# Patient Record
Sex: MALE | Race: WHITE | NOT HISPANIC OR LATINO | Employment: OTHER | URBAN - METROPOLITAN AREA
[De-identification: names, ages, dates, MRNs, and addresses within clinical notes are randomized per-mention and may not be internally consistent; named-entity substitution may affect disease eponyms.]

---

## 2021-02-08 ENCOUNTER — OFFICE VISIT (OUTPATIENT)
Dept: FAMILY MEDICINE CLINIC | Facility: CLINIC | Age: 31
End: 2021-02-08

## 2021-02-08 VITALS
TEMPERATURE: 98.6 F | SYSTOLIC BLOOD PRESSURE: 119 MMHG | DIASTOLIC BLOOD PRESSURE: 68 MMHG | BODY MASS INDEX: 19.46 KG/M2 | WEIGHT: 128.4 LBS | HEIGHT: 68 IN | RESPIRATION RATE: 16 BRPM | OXYGEN SATURATION: 99 % | HEART RATE: 82 BPM

## 2021-02-08 DIAGNOSIS — K59.00 CONSTIPATION, UNSPECIFIED CONSTIPATION TYPE: ICD-10-CM

## 2021-02-08 DIAGNOSIS — R30.0 DYSURIA: ICD-10-CM

## 2021-02-08 DIAGNOSIS — M54.16 LUMBAR BACK PAIN WITH RADICULOPATHY AFFECTING LEFT LOWER EXTREMITY: Primary | ICD-10-CM

## 2021-02-08 DIAGNOSIS — R11.10 VOMITING, INTRACTABILITY OF VOMITING NOT SPECIFIED, PRESENCE OF NAUSEA NOT SPECIFIED, UNSPECIFIED VOMITING TYPE: ICD-10-CM

## 2021-02-08 DIAGNOSIS — M53.3 SI (SACROILIAC) JOINT DYSFUNCTION: ICD-10-CM

## 2021-02-08 PROBLEM — K21.9 GERD (GASTROESOPHAGEAL REFLUX DISEASE): Status: ACTIVE | Noted: 2021-02-08

## 2021-02-08 PROCEDURE — 99213 OFFICE O/P EST LOW 20 MIN: CPT | Performed by: NURSE PRACTITIONER

## 2021-02-08 RX ORDER — METHYLPREDNISOLONE 4 MG/1
TABLET ORAL
Qty: 1 EACH | Refills: 0 | Status: SHIPPED | OUTPATIENT
Start: 2021-02-08

## 2021-02-08 RX ORDER — CYCLOBENZAPRINE HCL 5 MG
5 TABLET ORAL 2 TIMES DAILY PRN
Qty: 14 TABLET | Refills: 0 | Status: SHIPPED | OUTPATIENT
Start: 2021-02-08

## 2021-02-08 NOTE — PROGRESS NOTES
Subjective     Howie Myers is a 30 y.o.. male.     Pt stating he works as a  and has for many years.     Pt went to  on 1/24/21 for low back pain, lumbar xray done with negative results.    Back Pain  This is a new problem. Episode onset: 3 months. The problem is unchanged. The pain is present in the lumbar spine. The quality of the pain is described as aching and stabbing. Radiates to: mayo. legs. The pain is worse during the night. The symptoms are aggravated by sitting and standing. Associated symptoms include dysuria, headaches, leg pain, numbness, tingling and weakness. Pertinent negatives include no bladder incontinence, bowel incontinence, chest pain or fever. He has tried NSAIDs, ice and heat for the symptoms. The treatment provided no relief.       The following portions of the patient's history were reviewed and updated as appropriate: allergies, current medications, past family history, past medical history, past social history, past surgical history and problem list.    History reviewed. No pertinent past medical history.    Past Surgical History:   Procedure Laterality Date   • STOMACH SURGERY         Review of Systems   Constitutional: Negative for fever.   Respiratory: Positive for cough (smoker,  history of, not new). Negative for shortness of breath.    Cardiovascular: Negative for chest pain.   Gastrointestinal: Positive for constipation, diarrhea and vomiting. Negative for bowel incontinence and nausea.   Genitourinary: Positive for dysuria. Negative for bladder incontinence.   Musculoskeletal: Positive for back pain.   Neurological: Positive for tingling, weakness, numbness and headaches.       Allergies   Allergen Reactions   • Ketorolac Tromethamine Swelling   • Penicillins Rash       Objective     Vitals:    02/08/21 1034   BP: 119/68   BP Location: Left arm   Patient Position: Sitting   Cuff Size: Adult   Pulse: 82   Resp: 16   Temp: 98.6 °F (37 °C)   SpO2: 99%  "  Weight: 58.2 kg (128 lb 6.4 oz)   Height: 172.7 cm (68\")     Body mass index is 19.52 kg/m².    Physical Exam  Vitals signs reviewed.   HENT:      Head: Normocephalic.   Eyes:      Pupils: Pupils are equal, round, and reactive to light.   Cardiovascular:      Rate and Rhythm: Normal rate and regular rhythm.   Pulmonary:      Effort: Pulmonary effort is normal.      Breath sounds: Normal breath sounds.   Abdominal:      General: Bowel sounds are normal. There is no distension.      Palpations: There is no hepatomegaly or splenomegaly.      Tenderness: There is generalized abdominal tenderness. There is no guarding or rebound. Negative signs include McBurney's sign.      Hernia: No hernia is present.   Musculoskeletal:      Comments: Lumbar spine: limited ROM, no swelling or redness noted, tenderness noted to mayo. Sides, no midline pain noted, SI tenderness noted with pain in buttocks mayo., negative straight leg test   Neurological:      Mental Status: He is alert and oriented to person, place, and time.   Psychiatric:         Behavior: Behavior normal.         Current Outpatient Medications:   •  acetaminophen (TYLENOL) 500 MG tablet, Take 1 tablet by mouth Every 6 (Six) Hours As Needed for Mild Pain ., Disp: 30 tablet, Rfl: 0  •  ibuprofen (ADVIL,MOTRIN) 200 MG tablet, Take 200 mg by mouth Every 6 (Six) Hours As Needed for Mild Pain ., Disp: , Rfl:   •  predniSONE (DELTASONE) 50 MG tablet, Take 1 tablet by mouth Daily., Disp: 5 tablet, Rfl: 0  •  cyclobenzaprine (FLEXERIL) 5 MG tablet, Take 1 tablet by mouth 2 (Two) Times a Day As Needed for Muscle Spasms. Do not use when working, using machinery; causes drowsiness, Disp: 14 tablet, Rfl: 0  •  methylPREDNISolone (MEDROL) 4 MG dose pack, Take as directed on package instructions., Disp: 1 each, Rfl: 0      Assessment/Plan   Diagnoses and all orders for this visit:    1. Lumbar back pain with radiculopathy affecting left lower extremity (Primary)  -     CBC & " Differential  -     Comprehensive metabolic panel  -     MRI Lumbar Spine Without Contrast; Future  -     methylPREDNISolone (MEDROL) 4 MG dose pack; Take as directed on package instructions.  Dispense: 1 each; Refill: 0  -     cyclobenzaprine (FLEXERIL) 5 MG tablet; Take 1 tablet by mouth 2 (Two) Times a Day As Needed for Muscle Spasms. Do not use when working, using machinery; causes drowsiness  Dispense: 14 tablet; Refill: 0    2. SI (sacroiliac) joint dysfunction    3. Constipation, unspecified constipation type  -     XR Abdomen KUB; Future    4. Dysuria  -     Urinalysis With Culture If Indicated - Urine, Clean Catch  -     XR Abdomen KUB; Future    5. Vomiting, intractability of vomiting not specified, presence of nausea not specified, unspecified vomiting type  -     XR Abdomen KUB; Future        Patient Instructions   Lumbar back pain with radiculopathy/SI joint dysfunction: with recent xray negative, will order MRI for further eval.; If MRI negative will recommend physical therapy; If shows abnormality will refer to Ortho. Will try methylprednisolone pack and flexeril to help with inflammation and muscle spasms.     Dysuria/constipation/vomiting: will get U/A, KUB for further eval. Discussed constipation, recommend colace 100 mg bid, probiotic with fiber added, drink more water--goal 64 oz, eat more fruits and veges, stop all cheese in diet, decrease dairy in diet. Due to pt's past medical issues with GERD, gastritis, upper GI bleed and Nissen fundoplication will probably need referral to GI in near future; discussed this with pt and he is aware, will wait to see what KUB shows.        Return if symptoms worsen or fail to improve.

## 2021-02-08 NOTE — PATIENT INSTRUCTIONS
Lumbar back pain with radiculopathy/SI joint dysfunction: with recent xray negative, will order MRI for further eval.; If MRI negative will recommend physical therapy; If shows abnormality will refer to Ortho. Will try methylprednisolone pack and flexeril to help with inflammation and muscle spasms.     Dysuria/constipation/vomiting: will get U/A, KUB for further eval. Discussed constipation, recommend colace 100 mg bid, probiotic with fiber added, drink more water--goal 64 oz, eat more fruits and veges, stop all cheese in diet, decrease dairy in diet. Due to pt's past medical issues with GERD, gastritis, upper GI bleed and Nissen fundoplication will probably need referral to GI in near future; discussed this with pt and he is aware, will wait to see what KUB shows.      Will call with results once available.

## 2021-02-09 ENCOUNTER — APPOINTMENT (OUTPATIENT)
Dept: MRI IMAGING | Facility: HOSPITAL | Age: 31
End: 2021-02-09

## 2025-07-10 ENCOUNTER — HOSPITAL ENCOUNTER (EMERGENCY)
Facility: HOSPITAL | Age: 35
Discharge: HOME | End: 2025-07-10
Attending: EMERGENCY MEDICINE
Payer: MEDICAID

## 2025-07-10 VITALS
SYSTOLIC BLOOD PRESSURE: 96 MMHG | RESPIRATION RATE: 16 BRPM | DIASTOLIC BLOOD PRESSURE: 61 MMHG | HEIGHT: 68 IN | WEIGHT: 130 LBS | OXYGEN SATURATION: 96 % | HEART RATE: 55 BPM | TEMPERATURE: 97.8 F | BODY MASS INDEX: 19.7 KG/M2

## 2025-07-10 DIAGNOSIS — S61.512A WRIST LACERATION, LEFT, INITIAL ENCOUNTER: Primary | ICD-10-CM

## 2025-07-10 LAB — GLUCOSE BLDC GLUCOMTR-MCNC: 112 MG/DL (ref 70–130)

## 2025-07-10 PROCEDURE — 25010000002 LIDOCAINE 1% - EPINEPHRINE 1:100000 1 %-1:100000 SOLUTION: Performed by: NURSE PRACTITIONER

## 2025-07-10 PROCEDURE — 63710000001 ONDANSETRON ODT 4 MG TABLET DISPERSIBLE: Performed by: NURSE PRACTITIONER

## 2025-07-10 PROCEDURE — 99283 EMERGENCY DEPT VISIT LOW MDM: CPT | Performed by: EMERGENCY MEDICINE

## 2025-07-10 PROCEDURE — 82948 REAGENT STRIP/BLOOD GLUCOSE: CPT

## 2025-07-10 RX ORDER — ONDANSETRON 4 MG/1
4 TABLET, ORALLY DISINTEGRATING ORAL ONCE
Status: COMPLETED | OUTPATIENT
Start: 2025-07-10 | End: 2025-07-10

## 2025-07-10 RX ORDER — CEPHALEXIN 500 MG/1
500 CAPSULE ORAL 2 TIMES DAILY
Qty: 10 CAPSULE | Refills: 0 | Status: SHIPPED | OUTPATIENT
Start: 2025-07-10 | End: 2025-07-15

## 2025-07-10 RX ORDER — LIDOCAINE HYDROCHLORIDE AND EPINEPHRINE 10; 10 MG/ML; UG/ML
10 INJECTION, SOLUTION INFILTRATION; PERINEURAL ONCE
Status: COMPLETED | OUTPATIENT
Start: 2025-07-10 | End: 2025-07-10

## 2025-07-10 RX ADMIN — ONDANSETRON 4 MG: 4 TABLET, ORALLY DISINTEGRATING ORAL at 13:48

## 2025-07-10 RX ADMIN — LIDOCAINE HYDROCHLORIDE,EPINEPHRINE BITARTRATE 10 ML: 10; .01 INJECTION, SOLUTION INFILTRATION; PERINEURAL at 13:48

## 2025-07-10 NOTE — DISCHARGE INSTRUCTIONS
Laceration care:    1.  Keep initial dressing in place for 24 hours if able.  (if blood seeps through, then remove this dressing, wash gently with antibacterial soap and pat dry)    2.  After initial 24 hours wash the wound twice a day with antibacterial soap     3.  Cover with bandaid while at work    4.  Sutures will dissolve in about a week and do not need to be removed.    5.  Leave the steri strips in place  until they crumble up and fall off    Take antibiotics sent to pharmacy    Return Precautions    Although you are being discharged from the ED today, I encourage you to return for worsening symptoms.  Things can, and do, change such that treatment at home with medication may not be adequate.      Specifically, return for any of the following:    Chest pain, shortness of breath, pain or nausea and vomiting not controlled by medications provided.    Please make a follow up with your Primary Care Provider for a blood pressure recheck.

## 2025-07-10 NOTE — ED PROVIDER NOTES
EMERGENCY DEPARTMENT ENCOUNTER    Room Number:  13/13  Date seen:  7/10/2025  Time seen: 13:06 EDT  PCP: Provider, No Known  Historian: Patient not applicable    Discussed/obtained information from independent historians: Not applicable    HPI:  Chief complaint: Laceration left arm  A complete HPI/ROS/PMH/PSH/SH/FH are unobtainable due to: Not applicable  Context:Howie Myers is a 35 y.o. male with history of GERD, gastric ulcer, Nissen who presents to the ED with c/o acute left lower arm laceration sustained prior to arrival due to using a hand saw.  He was trying to hold the board steady and somehow the Eber cut him in the arm.  He does have some intermittent numbness and tingling to his digits but at time of my exam he is neurovascularly intact.  States his Tdap is up to date.     External (non-ED) record review: Reviewed recent orthopedic visit where patient was seen for left elbow pain.    Chronic or social conditions impacting care:n/a    ALLERGIES  Ketorolac tromethamine and Penicillins    PAST MEDICAL HISTORY  Active Ambulatory Problems     Diagnosis Date Noted    Acute blood loss anemia 07/10/2013    Dieulafoy lesion of stomach 07/10/2013    Erosive esophagitis 07/01/2016    Gastric ulcer with hemorrhage 07/10/2013    GERD (gastroesophageal reflux disease) 02/08/2021    Upper GI bleed 07/10/2013    History of gastric ulcer 07/01/2016    History of Nissen fundoplication 07/01/2016    Refusal of blood transfusions as patient is Jewish 07/10/2013     Resolved Ambulatory Problems     Diagnosis Date Noted    No Resolved Ambulatory Problems     No Additional Past Medical History       PAST SURGICAL HISTORY  Past Surgical History:   Procedure Laterality Date    STOMACH SURGERY         FAMILY HISTORY  No family history on file.    SOCIAL HISTORY  Social History     Socioeconomic History    Marital status:    Tobacco Use    Smoking status: Every Day    Smokeless tobacco: Never   Vaping  Use    Vaping status: Never Used   Substance and Sexual Activity    Drug use: Yes     Frequency: 1.0 times per week     Types: Marijuana       REVIEW OF SYSTEMS  Review of Systems    All systems reviewed and negative except for those discussed in HPI.     PHYSICAL EXAM    I have reviewed the triage vital signs and nursing notes.  Vitals:    07/10/25 1344   BP: 96/61   Pulse: 55   Resp:    Temp:    SpO2:      Physical Exam  Musculoskeletal:        Arms:       Comments: Left wrist laceration as documented above.  There is also a jagged, non gaping abrasion noted to base of left thumb.      Normal compartments  Radial pulse 2+  Normal capillary refill to digits  Normal sensation and motor function to digits.   No bony tenderness         GENERAL: not distressed, slightly anxious  HENT: nares patent  EYES: no scleral icterus  NECK: no ROM limitations  CV: regular rhythm, regular rate  RESPIRATORY: normal effort  ABDOMEN: soft  : deferred  MUSCULOSKELETAL: laceration to left wrist as indicated above.   NEURO: alert, moves all extremities, follows commands  SKIN: warm, dry    Laceration Repair    Date/Time: 7/10/2025 2:08 PM    Performed by: Rose Mary Lazaro APRN  Authorized by: Gustavo Reina MD    Consent:     Consent obtained:  Verbal    Consent given by:  Patient and spouse    Risks, benefits, and alternatives were discussed: yes      Risks discussed:  Infection, pain, poor cosmetic result, poor wound healing and need for additional repair    Alternatives discussed:  No treatment  Universal protocol:     Patient identity confirmed:  Verbally with patient and arm band  Anesthesia:     Anesthesia method:  Local infiltration    Local anesthetic:  Lidocaine 1% WITH epi  Laceration details:     Location:  Shoulder/arm    Shoulder/arm location:  L lower arm    Length (cm):  2.5  Pre-procedure details:     Preparation:  Patient was prepped and draped in usual sterile fashion  Exploration:     Limited defect created  (wound extended): no      Hemostasis achieved with:  Direct pressure    Wound exploration: wound explored through full range of motion and entire depth of wound visualized      Wound extent: no foreign bodies/material noted, no muscle damage noted, no nerve damage noted, no tendon damage noted, no underlying fracture noted and no vascular damage noted      Contaminated: no    Treatment:     Amount of cleaning:  Standard    Irrigation solution:  Sterile saline    Irrigation volume:  20    Irrigation method:  Pressure wash    Visualized foreign bodies/material removed: no      Debridement:  None    Undermining:  None    Scar revision: no    Skin repair:     Repair method:  Sutures    Suture size:  5-0    Suture material:  Plain gut    Suture technique:  Simple interrupted    Number of sutures:  5  Approximation:     Approximation:  Close  Repair type:     Repair type:  Simple  Post-procedure details:     Dressing:  Non-adherent dressing and bulky dressing (steri strips)    Procedure completion:  Tolerated    LAB RESULTS  Recent Results (from the past 24 hours)   POC Glucose Once    Collection Time: 07/10/25  1:41 PM    Specimen: Blood   Result Value Ref Range    Glucose 112 70 - 130 mg/dL     Ordered the above labs and independently interpreted results.  My findings will be discussed in the ED course or medical decision making section below    PROGRESS, DATA ANALYSIS, CONSULTS AND MEDICAL DECISION MAKING    Please note that this section constitutes my independent interpretation of clinical data including lab results, radiology, EKG's.  This constitutes my independent professional opinion regarding differential diagnosis and management of this patient.  It may include any factors such as history from outside sources, review of external records, social determinants of health, management of medications, response to those treatments, and discussions with other providers.    ED Course as of 07/10/25 1406   Thu Jul 10, 2025    1335 Pt feeling a bit lightheaded and nauseated.  Wife states he has eaten today.  BP stable.  Checking glucose.  [EW]      ED Course User Index  [EW] Rose Mary Lazaro APRN     Orders placed during this visit:  Orders Placed This Encounter   Procedures    POC Glucose STAT    POC Glucose Once          Medical Decision Making  Amount and/or Complexity of Data Reviewed  Labs: ordered.    Risk  Prescription drug management.    Pt presents with left wrist laceration sustained from table saw. I considered fracture, nerve injury, tendon injury.  The laceration is quite superficial and he has normal sensation and motor function.  Compartments are soft radial pulse and capillary refill is normal.  See laceration repair note.  Patient's Tdap is up-to-date.  Did send him home on about 5 days of Keflex.  Wound care discussed in his paperwork      DIAGNOSIS  Final diagnoses:   Wrist laceration, left, initial encounter          Medication List        New Prescriptions      cephalexin 500 MG capsule  Commonly known as: KEFLEX  Take 1 capsule by mouth 2 (Two) Times a Day for 5 days.               Where to Get Your Medications        These medications were sent to [facility] - [place], [place] - [address] [place] [address] - [phone]  - [phone] [address] [address]      Phone: [phone]   cephalexin 500 MG capsule         FOLLOW-UP  PATIENT CONNECTION - [place] [id number]  [phone]  Schedule an appointment as soon as possible for a visit   or follow up with Primary Care Provider        Latest Documented Vital Signs:  As of 14:06 EDT  BP- 96/61 HR- 55 Temp- 97.8 °F (36.6 °C) O2 sat- 96%    Appropriate PPE utilized throughout this patient encounter to include mask, if indicated, per current protocol. Hand hygiene was performed before donning PPE and after removal when leaving the room.    Please note that portions of this were completed with a voice recognition program.      Note Disclaimer: At [facility], we believe that sharing information builds trust and better relationships. You are receiving this note because you are receiving care at [facility] or recently visited. It is possible you will see health information before a provider has talked with you about it. This kind of information can be easy to misunderstand. To help you fully understand what it means for your health, we urge you to discuss this note with your provider.                 Rose Mary Lazaro, APRN  07/10/25 1419